# Patient Record
Sex: MALE | Race: OTHER | HISPANIC OR LATINO | URBAN - METROPOLITAN AREA
[De-identification: names, ages, dates, MRNs, and addresses within clinical notes are randomized per-mention and may not be internally consistent; named-entity substitution may affect disease eponyms.]

---

## 2019-08-22 ENCOUNTER — EMERGENCY (EMERGENCY)
Facility: HOSPITAL | Age: 49
LOS: 1 days | Discharge: HOME | End: 2019-08-22
Attending: EMERGENCY MEDICINE | Admitting: EMERGENCY MEDICINE
Payer: SELF-PAY

## 2019-08-22 VITALS
TEMPERATURE: 98 F | SYSTOLIC BLOOD PRESSURE: 182 MMHG | RESPIRATION RATE: 18 BRPM | WEIGHT: 198.42 LBS | DIASTOLIC BLOOD PRESSURE: 119 MMHG | OXYGEN SATURATION: 98 % | HEART RATE: 92 BPM

## 2019-08-22 VITALS — SYSTOLIC BLOOD PRESSURE: 161 MMHG | DIASTOLIC BLOOD PRESSURE: 97 MMHG

## 2019-08-22 LAB
ALBUMIN SERPL ELPH-MCNC: 3.7 G/DL — SIGNIFICANT CHANGE UP (ref 3.4–5)
ALP SERPL-CCNC: 156 U/L — HIGH (ref 40–120)
ALT FLD-CCNC: 254 U/L — HIGH (ref 12–42)
ANION GAP SERPL CALC-SCNC: 14 MMOL/L — SIGNIFICANT CHANGE UP (ref 9–16)
APTT BLD: 34.5 SEC — SIGNIFICANT CHANGE UP (ref 27.5–36.3)
AST SERPL-CCNC: 163 U/L — HIGH (ref 15–37)
BASOPHILS NFR BLD AUTO: 0.8 % — SIGNIFICANT CHANGE UP (ref 0–2)
BILIRUB SERPL-MCNC: 1.5 MG/DL — HIGH (ref 0.2–1.2)
BUN SERPL-MCNC: 10 MG/DL — SIGNIFICANT CHANGE UP (ref 7–23)
CALCIUM SERPL-MCNC: 8.9 MG/DL — SIGNIFICANT CHANGE UP (ref 8.5–10.5)
CHLORIDE SERPL-SCNC: 102 MMOL/L — SIGNIFICANT CHANGE UP (ref 96–108)
CO2 SERPL-SCNC: 21 MMOL/L — LOW (ref 22–31)
CREAT SERPL-MCNC: 0.96 MG/DL — SIGNIFICANT CHANGE UP (ref 0.5–1.3)
EOSINOPHIL NFR BLD AUTO: 3 % — SIGNIFICANT CHANGE UP (ref 0–6)
EXTRA GREEN TOP TUBE: SIGNIFICANT CHANGE UP
GLUCOSE SERPL-MCNC: 272 MG/DL — HIGH (ref 70–99)
HCT VFR BLD CALC: 43.1 % — SIGNIFICANT CHANGE UP (ref 39–50)
HGB BLD-MCNC: 14.7 G/DL — SIGNIFICANT CHANGE UP (ref 13–17)
IMM GRANULOCYTES NFR BLD AUTO: 0.4 % — SIGNIFICANT CHANGE UP (ref 0–1.5)
INR BLD: 1.08 — SIGNIFICANT CHANGE UP (ref 0.88–1.16)
LYMPHOCYTES # BLD AUTO: 32.5 % — SIGNIFICANT CHANGE UP (ref 13–44)
MCHC RBC-ENTMCNC: 28.9 PG — SIGNIFICANT CHANGE UP (ref 27–34)
MCHC RBC-ENTMCNC: 34.1 G/DL — SIGNIFICANT CHANGE UP (ref 32–36)
MCV RBC AUTO: 84.7 FL — SIGNIFICANT CHANGE UP (ref 80–100)
MONOCYTES NFR BLD AUTO: 5.6 % — SIGNIFICANT CHANGE UP (ref 2–14)
NEUTROPHILS NFR BLD AUTO: 57.7 % — SIGNIFICANT CHANGE UP (ref 43–77)
PLATELET # BLD AUTO: 260 K/UL — SIGNIFICANT CHANGE UP (ref 150–400)
POTASSIUM SERPL-MCNC: 3.9 MMOL/L — SIGNIFICANT CHANGE UP (ref 3.5–5.3)
POTASSIUM SERPL-SCNC: 3.9 MMOL/L — SIGNIFICANT CHANGE UP (ref 3.5–5.3)
PROT SERPL-MCNC: 8 G/DL — SIGNIFICANT CHANGE UP (ref 6.4–8.2)
PROTHROM AB SERPL-ACNC: 12 SEC — SIGNIFICANT CHANGE UP (ref 10–12.9)
RBC # BLD: 5.09 M/UL — SIGNIFICANT CHANGE UP (ref 4.2–5.8)
RBC # FLD: 12.6 % — SIGNIFICANT CHANGE UP (ref 10.3–14.5)
SODIUM SERPL-SCNC: 137 MMOL/L — SIGNIFICANT CHANGE UP (ref 132–145)
WBC # BLD: 7.9 K/UL — SIGNIFICANT CHANGE UP (ref 3.8–10.5)
WBC # FLD AUTO: 7.9 K/UL — SIGNIFICANT CHANGE UP (ref 3.8–10.5)

## 2019-08-22 PROCEDURE — 71046 X-RAY EXAM CHEST 2 VIEWS: CPT | Mod: 26

## 2019-08-22 PROCEDURE — 93010 ELECTROCARDIOGRAM REPORT: CPT

## 2019-08-22 PROCEDURE — 99285 EMERGENCY DEPT VISIT HI MDM: CPT | Mod: 25

## 2019-08-22 RX ORDER — METOPROLOL TARTRATE 50 MG
50 TABLET ORAL ONCE
Refills: 0 | Status: COMPLETED | OUTPATIENT
Start: 2019-08-22 | End: 2019-08-22

## 2019-08-22 RX ORDER — METFORMIN HYDROCHLORIDE 850 MG/1
1 TABLET ORAL
Qty: 14 | Refills: 0
Start: 2019-08-22 | End: 2019-09-04

## 2019-08-22 RX ORDER — AMLODIPINE BESYLATE 2.5 MG/1
1 TABLET ORAL
Qty: 14 | Refills: 0
Start: 2019-08-22 | End: 2019-09-04

## 2019-08-22 RX ADMIN — Medication 50 MILLIGRAM(S): at 12:13

## 2019-08-22 NOTE — ED PROVIDER NOTE - PHYSICAL EXAMINATION
VITAL SIGNS: I have reviewed nursing notes and confirm.  CONSTITUTIONAL: Well-developed; well-nourished; in no acute distress.  SKIN: Skin is warm and dry, no acute rash.  HEAD: Normocephalic; atraumatic.  EYES: PERRL, EOM intact; conjunctiva and sclera clear.  ENT: No nasal discharge; airway clear.  NECK: Supple; non tender.  CARD: S1, S2 normal; no murmurs, gallops, or rubs. Regular rate and rhythm.  Equal radial pulses b/l, +2.  No peripheral edema.   RESP: No wheezes, rales or rhonchi.  ABD: Normal bowel sounds; soft; non-distended; non-tender; no hepatosplenomegaly.  MSK: Normal ROM. No clubbing, cyanosis or edema.  NEURO: Alert, oriented. Grossly unremarkable.  PSYCH: Cooperative, appropriate.

## 2019-08-22 NOTE — ED PROVIDER NOTE - OBJECTIVE STATEMENT
Pt is a 50yo M who denies any PMH but admits it has been years since he last saw a doctor.  Reports slow onset of dizziness described as dysequilibrium.  Sxs lasted for a few hours and relates it to drinking to large iced coffees this morning.  Pt reports he never drinks coffee and cannot remember the last time he had any.  Denies any concurrent CP, SOB, N/V, abd pain, or other concerns.  EMS called by his job and they noted elevated BP and blood sugar.

## 2019-08-22 NOTE — ED PROVIDER NOTE - PROGRESS NOTE DETAILS
Pt remains asymptomatic here today.  Discussed likelihood of undiagnosed HTN and DM.  Pt evaluated by SW and pt is without insurance.  Will refer to [facility] for primary care.  Will start on Metformin and Amlodipine for control until he can f/u.  Discussed both dx at length.

## 2019-08-22 NOTE — ED PROVIDER NOTE - CLINICAL SUMMARY MEDICAL DECISION MAKING FREE TEXT BOX
Pt with likely undiagnosed HTN and DM.  Sxs may have been triggered by caffeine overdose today in setting of being caffeine naive.  Noted differences in BPs but pt without CP.  Normal pulses.  Will check CXR and troponin.  EKG without acute ischemia.  Will assess labs and treat accordingly.

## 2019-08-22 NOTE — ED PROVIDER NOTE - NSFOLLOWUPINSTRUCTIONS_ED_ALL_ED_FT
-PLEASE FOLLOW-UP WITH A PRIMARY DOCTOR WITHIN THE NEXT WEEK.  YOU LIKELY HAVE BOTH HIGH BLOOD PRESSURE AND DIABETES THAT NEEDS TO BE FOLLOWED CLOSELY.     -PLEASE GO TO YOUR PHARMACY TO FILL YOUR PRESCRIPTIONS.  PLEASE START TODAY AND USE AS DIRECTED.    -PLEASE RETURN TO THE ER IMMEDIATELY OR CALL 911 FOR ANY HIGH FEVER, TROUBLE BREATHING, VOMITING, SEVERE PAIN, OR ANY OTHER CONCERNS.    Hipertensión crónica    LO QUE NECESITA SABER:    La hipertensión es la presión arterial inna. La presión arterial es la fuerza que ejerce la margarito al moverse contra las barron de las arterias. La hipertensión causa que coyle presión arterial se eleve tanto que coyle corazón se ve forzado a trabajar mucho más lance que lo normal. [place] puede dañar coyle corazón. Incluso si tiene hipertensión richard años, los cambios de estilo de linetet, medicamentos o ambos pueden ayudar a bajar la presión arterial a niveles normales.    INSTRUCCIONES SOBRE EL INNA HOSPITALARIA:    Llame al 911 en rakan de presentar lo siguiente:    Usted tiene dolor en el pecho.      Tiene alguno de los siguientes signos de un ataque cardíaco:   Estrujamiento, presión o tensión en coyle pecho      Usted también podría presentar alguno de los siguientes:   Malestar o dolor en coyle espalda, jose, mandíbula, abdomen, o brazo      Falta de aliento      Náuseas o vómitos      Desvanecimiento o sudor frío repentino      Usted se siente confundido o tiene dificultad para hablar.      Repentinamente se siente aturdido o con dificultad para respirar.    Regrese a la rigoberto de emergencias si:    Usted tiene un lance dolor de patti o pérdida de la visión.      Usted tiene debilidad en un brazo o en elizabeth pierna.    Comuníquese con coyle médico si:    Usted se siente mareado, confundido, somnoliento o gabbi si se fuera a desmayar.      Usted ha estado tomando medicamento para la presión arterial davy todavía está en un nivel más elevado del que coyle médico le indicó que debería estar.      Usted tiene preguntas o inquietudes acerca de coyle condición o cuidado.    Medicamentos:Es posible que usted necesite alguno de los siguientes:     Los antihipertensivospodrían usarse para ayudar a disminuir la presión arterial. Varios tipos de medicamentos están disponibles. Coyle médico podría modificar los medicamentos que myla. [place] puede ser necesario si coyle presión arterial suele ser inna cuando usted la controla en coyle casa o tiene otros problemas con el control de la presión arterial.      Los diuréticosayudan a eliminar el exceso de líquido que se acumula en el organismo. [place] contribuirá a bajar coyle presión arterial. Es posible que orine más seguido mientras myla tiffany medicamento.      Los medicamentos para el colesterolayudan a bajar los niveles de colesterol. Un nivel bajo de colesterol ayuda a prevenir enfermedades cardíacas y facilita el control de la presión arterial.      [place] aguila medicamentos gabbi se le haya indicado.Consulte con coyle médico si usted riddhi que coyle medicamento no le está ayudando o si presenta efectos secundarios. Infórmele si es alérgico a cualquier medicamento. Mantenga elizabeth lista actualizada de los medicamentos, las vitaminas y los productos herbales que myla. Incluya los siguientes datos de los medicamentos: cantidad, frecuencia y motivo de administración. Traiga con usted la lista o los envases de las píldoras a aguila citas de seguimiento. Lleve la lista de los medicamentos con usted en rakan de elizabeth emergencia.    Acuda a aguila consultas de control con coyle médico según le indicaron.Usted necesitará regresar para medir coyle presión arterial y realizar otros exámenes de laboratorio. [place]ote aguila preguntas para que se acuerde de hacerlas richard aguila visitas.    [place]apas de la hipertensión:Lecturas de la presión arterial         Elizabeth presión arterial normal es 119/79 o inferior. Coyle médico podría comprobar coyle presión arterial solamente cada año si se mantiene en un nivel normal.      Elizabeth presión arterial elevada es de 120/79 a 129/79. A veces esto se llama prehipertensión. Coyle médico puede sugerir cambios de estilo de linette para ayudar a bajar la presión arterial a un nivel normal. Entonces él podría comprobar coyle presión otra vez en 3 a 6 meses.      La etapa 1 de la hipertensión es de 130/80 a 139/89. Coyle médico podría recomendar cambios de estilo de linette, medicamentos y controles cada 3 a 6 meses hasta que coyle presión arterial esté controlada.      La etapa 2 de la hipertensión es de 140/90 o mayor. Coyle médico le recomendará cambios en el estilo de linette y le indicará 2 clases de medicamentos para la hipertensión. También necesitará controlar coyle presión arterial cada mes hasta que esté controlada.    Controle la hipertensión crónica:    Controle coyle presión arterial en casa.Evite fumar, consumir cafeína y hacer ejercicio al menos 30 minutos antes de controlar coyle presión arterial. Siéntese y descanse por 5 minutos antes de tomarse la presión arterial. Extienda coyle brazo y apóyelo en elizabeth superficie plana. Coyle brazo debe estar a la misma altura que coyle corazón. Siga las instrucciones que vienen con el monitor para la presión arterial. Controle coyle presión arterial 2 veces, con diferencia de 1 minuto, antes de rashad coyle medicamento por la mañana. También controle coyle presión arterial antes de la lynette. Mantenga un registro de coyle peso y llévelo con usted a las citas de control. Pregúntele a coyle médico cuál debería ser coyle presión arterial. Cómo rashad la presión arterial           Controle cualquier otra condición médica que usted tenga.Algunas condiciones médicas gabbi la diabetes pueden aumentar coyle riesgo de hipertensión. Siga las instrucciones de coyle médico y tómese aguila medicamentos según dichas instrucciones. Hable con coyle médico sobre cualquier nueva afección médica que haya desarrollado recientemente.      Pregunte sobre los medicamentos.Ciertos medicamentos pueden aumentar coyle presión arterial. Los ejemplos incluyen las píldoras anticonceptivas orales, los descongestivos, los suplementos herbales y los MAXX, gabbi el ibuprofeno. Coyle médico puede indicarle qué medicamentos son seguros para usted. Estos medicamentos incluyen los recetados y de venta isabel.    Cambios de estilo de linette que usted puede hacer para reducir coyle presión arterial:Coyle médico laureen vez quiera que delaney más cambios de estilo de linette si usted tiene problemas para controlar coyle presión arterial. [place] puede parecer difícil con el tiempo, especialmente si usted piensa que usted está haciendo buenos cambios davy coyle presión sigue siendo inna. Laureen vez lo ayude centrarse en un nuevo cambio a la vez. Por ejemplo, intente agregar 1 día más de ejercicio o ejercite richard 10 minutos adicionales en 2 días. Pequeños cambios pueden hacer elizabeth gran diferencia. Coyle médico también puede derivarlo a los especialistas, gabbi un dietista que pueda ayudarlo a hacer pequeños cambios.    Limite el sodio (la sal) gabbi se le haya indicado.Demasiado sodio puede afectar el equilibrio de líquidos. Revise las etiquetas para buscar alimentos bajos en sodio o sin sal agregada. Algunos alimentos bajos en sodio utilizan sales de potasio para añadir sabor. Demasiado potasio también puede causar problemas de mercedes. Coyle médico le dirá qué cantidad de sodio y potasio es brice para el consumo en un día. Él puede recomendarle que limite el sodio a 2,300 mg al día.           Siga el plan de comidas recomendado por coyle médico.Un dietista o médico puede darle más información sobre planes de bajo contenido de sodio o el plan de alimentación DASH (enfoques dietéticos para detener la hipertensión). El plan DASH es bajo en sodio, grasas saturadas y grasa total. Es alto en potasio, calcio y fibra.            Ejercítese para mantener un peso saludable.Realice actividad física por lo menos 30 minutos al día, la mayoría de los días de la semana. [place] ayudará a bajar coyle presión arterial. Pregunte a coyle médico acerca del mejor plan de ejercicio para usted. Caminar para ejercitarse           Disminuya el estrés.Es posible que esto contribuya a bajar coyle presión arterial. Aprenda sobre formas de relajarse, gabbi respiración profunda o escuchar música.      Limite el consumo de alcohol según le indicaron.El alcohol puede aumentar la presión arterial. Un trago equivale a 12 onzas de cerveza, 5 onzas de vino o 1 onza y ½ de licor.      No fume.La nicotina y otros químicos en los cigarrillos y cigarros pueden aumentar coyle presión arterial y también provocar daño al pulmón. Pida información a coyle médico si usted actualmente fuma y necesita ayuda para dejar de fumar. Los cigarrillos electrónicos o el tabaco sin humo igualmente contienen nicotina. Consulte con coyle médico antes de utilizar estos productos.

## 2019-08-22 NOTE — ED PROVIDER NOTE - CARE PLAN
Principal Discharge DX:	Dizziness  Secondary Diagnosis:	HTN (hypertension)  Secondary Diagnosis:	Hyperglycemia

## 2019-08-22 NOTE — ED ADULT NURSE NOTE - NSIMPLEMENTINTERV_GEN_ALL_ED
Implemented All Universal Safety Interventions:  [place] to call system. Call bell, personal items and telephone within reach. Instruct patient to call for assistance. Room bathroom lighting operational. Non-slip footwear when patient is off stretcher. Physically safe environment: no spills, clutter or unnecessary equipment. Stretcher in lowest position, wheels locked, appropriate side rails in place.

## 2019-08-22 NOTE — ED ADULT TRIAGE NOTE - CHIEF COMPLAINT QUOTE
was picked up at work for acute onset of dizziness after drinking 2 cups of coffee ( not a coffee drinker )- pt was hypertensive on scene and hyperglycemic- Pt arrives here c/o dizziness- Pt denies CP, SOB, headache, or change in vision

## 2019-08-22 NOTE — ED PROVIDER NOTE - DIAGNOSTIC INTERPRETATION
Chest x-ray interpreted by ER Physician Dr. Figueroa  Findings: heart size normal, no infiltrates, lungs fully expanded, soft tissues appear normal.

## 2019-08-27 DIAGNOSIS — R73.9 HYPERGLYCEMIA, UNSPECIFIED: ICD-10-CM

## 2019-08-27 DIAGNOSIS — R42 DIZZINESS AND GIDDINESS: ICD-10-CM

## 2019-08-27 DIAGNOSIS — I10 ESSENTIAL (PRIMARY) HYPERTENSION: ICD-10-CM
